# Patient Record
(demographics unavailable — no encounter records)

---

## 2018-07-10 NOTE — RAD
CHEST ONE VIEW:

 

History: Dyspnea. 

 

Comparison: 12-23-16

 

FINDINGS: 

Cardiac silhouette is partially obscured by right pleural fluid and basilar infiltrate. Mediastinum i
s midline allowing for slight rightward rotation of the patient. Post-operative changes of mediastinu
m. Old right clavicular fracture. No evidence of pneumothorax. 

 

IMPRESSION: 

Right pleural fluid and basilar infiltrate. Cause is not evident. 

 

POS: [facility]

## 2018-10-30 NOTE — CT
CT BRAIN WITHOUT CONTRAST:

 

HISTORY: 

Headache, dizziness, fall a week ago with trauma to the head.

 

FINDINGS: 

No evidence of infarct, hemorrhage, midline shift, or abnormal extraaxial fluid collections is seen. 
 The ventricular size is normal and the basilar cisterns are patent.  The bony calvarium is intact.  
There is a suggestion of an age-indeterminate fracture involving the right orbit.  There is mucosal d
isease in the left ethmoid sinuses.  

 

IMPRESSION: 

1.  No CT evidence of acute intracranial process.

 

2.  Age-indeterminate probable right orbital fracture.  Further evaluation with CT scan of the  facia
l bones is recommended.

 

POS: [facility]

## 2018-10-30 NOTE — CT
CT FACE NONCONTRAST:

 

Date:  10/30/18 

 

HISTORY:  

Fall with facial injury. Abnormal CT head. 

 

FINDINGS:

Coronal images best demonstrate inferior orbital fat protruding through the orbital floor. This measu
res up to 1.1 cm diameter. It is very well circumscribed with well corticated margins of the cortical
 floor osseous defect. No fluid within the right maxillary sinus. The inferior rectus muscle is withi
n normal limits. 

 

The mandible, globes, and zygomatic arches are intact. Mucosal thickening is apparent within the left
 ethmoid air cells without defect of the lamina papyracea. 

 

IMPRESSION: 

Abnormal involving the right orbital floor has the appearance of an old injury with protrusion of orb
ital fat through the defect. No acute fractures are apparent. 

 

 

 

POS: [facility]

## 2018-10-30 NOTE — RAD
CHEST ONE VIEW:

 

HISTORY:

Chest pain.  Shortness of breath.

 

COMPARISON:

07/10/2018

 

FINDINGS:

Heart size is within normal limits.  There are postop sternotomy changes.  The lungs are clear of inf
iltrates.

 

IMPRESSION:

No active intrathoracic disease.

 

POS: [facility]

## 2018-11-03 NOTE — EKG
Test Reason : 

Blood Pressure : ***/*** mmHG

Vent. Rate : 088 BPM     Atrial Rate : 088 BPM

   P-R Int : 130 ms          QRS Dur : 064 ms

    QT Int : 368 ms       P-R-T Axes : 056 020 096 degrees

   QTc Int : 445 ms

 

Normal sinus rhythm

Abnormal QRS-T angle, consider primary T wave abnormality

Abnormal ECG

 

Confirmed by CORY RUBIO DO ([id number]),  CAROLYNN ROBLES ([id number]) on 11/3/2018 11:24:33 PM

 

Referred By:             Confirmed By:CORY RUBIO DO

## 2019-01-04 NOTE — RAD
CHEST TWO VIEWS:

 

HISTORY:

Abnormal blood sugar and potassium.

 

COMPARISON:

12/23/2016

 

FINDINGS:

Sternotomy wires are noted.  Normal cardiac silhouette.  The pulmonary vessels and hilum are normal. 
 Costophrenic angles are clear.  No masses or consolidation.  No pneumothorax or osseous abnormality.
  Old right clavicle injury is noted.

 

IMPRESSION:

No acute cardiopulmonary process.

 

POS: [facility]

## 2019-01-05 NOTE — NM
NUCLEAR MEDICINE CARDIAC STRESS TEST WITH EJECTION FRACTION:

 

HISTORY:  

Chest pain. Hypertension. 

 

COMPARISON:  

None. 

 

TECHNIQUE:  

Stress and rest was performed after the intravenous administration of 30 and 9 mCi technetium-99m ses
tamibi intravenously, respectively. 

 

FINDINGS:

Adequate left ventricular uptake of radiotracer. Large lateral inferior wall scar with small area of 
periinfarct ischemia. 

 

Wall motion is normal. Ejection fraction of over 70%. 

 

IMPRESSION: 

Large lateral inferior wall scar with periinfarct ischemia. Ejection fraction is normal, as well as w
all motion. 

 

 

POS: [facility]

## 2019-01-05 NOTE — HP
PRIMARY CARE PROVIDER:  At [facility].



CHIEF COMPLAINT:  Chest pain.



HISTORY OF PRESENT ILLNESS:  Ms. Pierre is a pleasant 50-year-old lady, who was seen

at [facility] on 01/04/1019.  She was sent to the emergency

room by her primary care provider. 



She reports that she had coronary artery bypass graft in 06/2018 by Dr. Lisa Avila in [place].  Her cardiologist is Dr. Granados in [place].  She also reports

that she had to have a procedure to drain fluid from lungs in 06/2018, two days

after her surgery. 



She reports that she has had on and off shortness of breath since her surgery.  Over

the last week, she started having left-sided chest pain.  She reports that it is on

and off, nonradiating, accompanied by worsening shortness of breath.  It initially

started when she was carrying a gallon of milk.  She reports that the chest pain is

worse with exertion, improved with rest.  She denies any nausea.  She also reports

epigastric discomfort, but is unable to clarify it further. 



She was seen by her primary care provider.  She was found to have mild elevation of

potassium.  She was sent to the emergency room for further evaluation. 



REVIEW OF SYSTEMS:  All other systems reviewed and found to be negative.



PAST MEDICAL HISTORY:  Coronary artery disease, hepatitis C, condyloma acuminatum,

peripheral neuropathy, hypertension, diabetes mellitus type 2, and dyslipidemia. 



PAST SURGICAL HISTORY:  Coronary artery bypass graft surgery, three vessels on

06/04/2018. 



PSYCHIATRIC HISTORY:  Anxiety and depression.



SOCIAL HISTORY:  The patient denies tobacco use, alcohol use, and recreational drug

use.  She ambulates with a cane. 



FAMILY HISTORY:  Significant for coronary artery disease in both of parents.



ALLERGIES:  CODEINE, DEMEROL, AND PENICILLIN.



CURRENT MEDICATIONS:  

1. Aspirin 81 mg daily.

2. Nitroglycerin p.r.n.

3. Atorvastatin 80 mg at bedtime.

4. Celexa 40 mg daily.

5. Plavix 75 mg daily.

6. Lasix 60 mg daily.

7. Gabapentin 100 mg three times a day.

8. Metformin 1000 mg 2 times a day.

9. Lopressor 12.5 mg two times a day.

10. Januvia 100 mg daily.



PHYSICAL EXAMINATION:

GENERAL:  Ms. Pierre is awake and alert, not in acute distress. 

VITAL SIGNS:  Blood pressure is 114/89, pulse 85, respiratory rate 17, and oxygen

saturation 97% on room air.  She is afebrile. 

EYES:  No scleral icterus, no conjunctival pallor. 

ENT:  Moist mucosal membranes.  No oropharyngeal erythema or exudates. 

NECK:  Supple, nontender, trachea is midline. 

RESPIRATORY:  Accessory muscles of breathing are not active.  Chest wall movements

are symmetric bilaterally. 

LUNGS:  Clear to auscultation without wheeze, rhonchi, or crepitations. 

CARDIOVASCULAR:  S1 and S2 are heard, regular.  Peripheral pulses palpable.  No

carotid bruit.  No pericardial rub. 

ABDOMEN:  Soft and nontender.  Bowel sounds heard.  No hepatomegaly.  No

splenomegaly. 

NEUROLOGIC:  Cranial nerves 2 through 12 are intact.  Deep tendon reflexes 2+. 

MUSCULOSKELETAL:  Palpable chest wall tenderness over the left upper chest.  Power

is 5/5 in all four extremities. 

SKIN:  No rashes or subcutaneous nodules. 

LYMPHATIC:  No cervical lymphadenopathy. 

PSYCHIATRIC:  Normal mood.  Normal affect.  The patient is oriented to person,

place, and time. 



LABORATORY DATA:  Ms. Pierre labs and investigations were reviewed.  I reviewed her

electrocardiogram, which shows normal sinus rhythm.  No ST changes to suggest an

acute coronary syndrome.  I also reviewed her chest x-ray, which does not show any

pulmonary infiltrates.  She has an unremarkable CBC, decreased sodium of 131, mildly

elevated potassium of 5.3, normal blood urea nitrogen, elevated creatinine of 1.26,

last known creatinine 1.34 on 10/30/2018, unremarkable liver profile and troponin-I

that is negative x3.  Blood glucose is 390. 



ASSESSMENT AND PLAN:  Ms. Pierre is a pleasant 50-year-old lady, who was seen at [facility] on 01/04/2019.  Her problem list includes: 

1. Chest pain:  Ms. Pierre presenting with chest pain.  She has significant cardiac

history including coronary artery bypass graft.  She will be admitted to the

hospital for further management including telemetry monitoring and I will also

obtain a stress test.  Her chest pain is atypical in the sense that she has

reproducible tenderness over the left upper chest wall; however, she also reports

pain over the left lower chest wall and there is no reproducible tenderness at this

point.  We will also check D-dimer. 

2. Diabetes mellitus type 2:  Poorly controlled.  We will start her on Accu-Cheks

and insulin sliding scale. 

3. Hypertension:  We will resume home medications, monitor vital signs and titrate

antihypertensives as needed. 

4. Dyslipidemia:  We will continue statin.

5. Chronic kidney disease:  Appears to be stable.

6. Hyperkalemia:  Mild.  We will administer Kayexalate and recheck potassium level. 

Many thanks for allowing me to participate in your patient's care.  Please feel free

to contact me with any questions or concerns. 



LEVEL OF RISK:  High.



LEVEL OF COMPLEXITY:  High.







Job ID:  [id number]

## 2019-01-12 NOTE — EKG
Test Reason : 

Blood Pressure : ***/*** mmHG

Vent. Rate : 071 BPM     Atrial Rate : 071 BPM

   P-R Int : 132 ms          QRS Dur : 076 ms

    QT Int : 398 ms       P-R-T Axes : 055 018 080 degrees

   QTc Int : 432 ms

 

Normal sinus rhythm

Possible Left atrial enlargement

 

 

Confirmed by CARLA BAÑUELOS ([id number]),  CAROLYNN ROBLES ([id number]) on 1/12/2019 8:16:06 PM

 

Referred By:             Confirmed By:CARLA BAÑUELOS

## 2019-06-11 NOTE — RAD
EXAM: Single view of the chest



HISTORY:   Dyspnea



COMPARISON: 10/30/2018



FINDINGS: Single view of the chest shows a normal sized cardiomediastinal silhouette.  The patient is
 status post CABG. There is no evidence of consolidation, mass, or pleural effusion. The bones are

unremarkable.



IMPRESSION: No evidence of acute cardiopulmonary disease



Reported By: Mik Benavidez 

Electronically Signed:  6/11/2019 7:36 PM